# Patient Record
Sex: MALE | Race: BLACK OR AFRICAN AMERICAN | URBAN - METROPOLITAN AREA
[De-identification: names, ages, dates, MRNs, and addresses within clinical notes are randomized per-mention and may not be internally consistent; named-entity substitution may affect disease eponyms.]

---

## 2024-08-04 SDOH — HEALTH STABILITY: PHYSICAL HEALTH: ON AVERAGE, HOW MANY DAYS PER WEEK DO YOU ENGAGE IN MODERATE TO STRENUOUS EXERCISE (LIKE A BRISK WALK)?: 3 DAYS

## 2024-08-04 SDOH — HEALTH STABILITY: PHYSICAL HEALTH: ON AVERAGE, HOW MANY MINUTES DO YOU ENGAGE IN EXERCISE AT THIS LEVEL?: 30 MIN

## 2024-08-07 ENCOUNTER — OFFICE VISIT (OUTPATIENT)
Dept: PRIMARY CARE CLINIC | Age: 34
End: 2024-08-07
Payer: COMMERCIAL

## 2024-08-07 VITALS
DIASTOLIC BLOOD PRESSURE: 80 MMHG | WEIGHT: 176 LBS | SYSTOLIC BLOOD PRESSURE: 122 MMHG | HEART RATE: 89 BPM | BODY MASS INDEX: 28.28 KG/M2 | OXYGEN SATURATION: 97 % | HEIGHT: 66 IN

## 2024-08-07 DIAGNOSIS — Z87.19 H/O DENTAL ABSCESS: ICD-10-CM

## 2024-08-07 DIAGNOSIS — E10.3299 TYPE 1 DIABETES MELLITUS WITH BACKGROUND DIABETIC RETINOPATHY (HCC): ICD-10-CM

## 2024-08-07 DIAGNOSIS — Z76.89 ENCOUNTER TO ESTABLISH CARE: Primary | ICD-10-CM

## 2024-08-07 PROBLEM — K12.2 SUBMANDIBULAR ABSCESS: Status: ACTIVE | Noted: 2024-07-09

## 2024-08-07 PROBLEM — E11.10 DIABETIC ACIDOSIS WITHOUT COMA (HCC): Status: ACTIVE | Noted: 2024-07-07

## 2024-08-07 PROBLEM — M72.6 NECROTIZING FASCIITIS (HCC): Status: ACTIVE | Noted: 2024-07-09

## 2024-08-07 PROBLEM — E11.65 UNCONTROLLED DIABETES MELLITUS WITH HYPERGLYCEMIA (HCC): Status: ACTIVE | Noted: 2024-07-15

## 2024-08-07 PROCEDURE — 99203 OFFICE O/P NEW LOW 30 MIN: CPT | Performed by: NURSE PRACTITIONER

## 2024-08-07 RX ORDER — INSULIN LISPRO 100 [IU]/ML
7 INJECTION, SOLUTION INTRAVENOUS; SUBCUTANEOUS
COMMUNITY
Start: 2024-07-17 | End: 2025-07-17

## 2024-08-07 RX ORDER — PEN NEEDLE, DIABETIC 32GX 5/32"
NEEDLE, DISPOSABLE MISCELLANEOUS
Qty: 100 EACH | Status: CANCELLED | OUTPATIENT
Start: 2024-08-07

## 2024-08-07 RX ORDER — ISOPROPYL ALCOHOL 70 ML/100ML
SWAB TOPICAL
COMMUNITY
Start: 2024-07-17

## 2024-08-07 RX ORDER — INSULIN LISPRO 100 [IU]/ML
INJECTION, SOLUTION INTRAVENOUS; SUBCUTANEOUS
Qty: 3 ML | Refills: 5 | Status: SHIPPED | OUTPATIENT
Start: 2024-08-07

## 2024-08-07 RX ORDER — AMOXICILLIN AND CLAVULANATE POTASSIUM 875; 125 MG/1; MG/1
1 TABLET, FILM COATED ORAL 2 TIMES DAILY
COMMUNITY
Start: 2024-07-17 | End: 2024-08-20

## 2024-08-07 RX ORDER — BLOOD-GLUCOSE METER
EACH MISCELLANEOUS
COMMUNITY
Start: 2024-07-17

## 2024-08-07 RX ORDER — DOXYCYCLINE HYCLATE 100 MG/1
100 CAPSULE ORAL 2 TIMES DAILY
COMMUNITY
Start: 2024-07-17 | End: 2024-08-20

## 2024-08-07 RX ORDER — LANCETS 33 GAUGE
EACH MISCELLANEOUS
COMMUNITY
Start: 2024-07-17

## 2024-08-07 RX ORDER — PEN NEEDLE, DIABETIC 32GX 5/32"
NEEDLE, DISPOSABLE MISCELLANEOUS
COMMUNITY
Start: 2024-07-17

## 2024-08-07 RX ORDER — LANCETS 33 GAUGE
EACH MISCELLANEOUS
Status: CANCELLED | OUTPATIENT
Start: 2024-08-07

## 2024-08-07 RX ORDER — ACYCLOVIR 400 MG/1
1 TABLET ORAL SEE ADMIN INSTRUCTIONS
Qty: 1 EACH | Refills: 0 | Status: SHIPPED | OUTPATIENT
Start: 2024-08-07

## 2024-08-07 RX ORDER — INSULIN LISPRO 100 [IU]/ML
7 INJECTION, SOLUTION INTRAVENOUS; SUBCUTANEOUS
Status: CANCELLED | OUTPATIENT
Start: 2024-08-07 | End: 2025-08-07

## 2024-08-07 RX ORDER — ACYCLOVIR 400 MG/1
1 TABLET ORAL
Qty: 1 EACH | Refills: 11 | Status: SHIPPED | OUTPATIENT
Start: 2024-08-07

## 2024-08-07 SDOH — ECONOMIC STABILITY: FOOD INSECURITY: WITHIN THE PAST 12 MONTHS, YOU WORRIED THAT YOUR FOOD WOULD RUN OUT BEFORE YOU GOT MONEY TO BUY MORE.: NEVER TRUE

## 2024-08-07 SDOH — ECONOMIC STABILITY: INCOME INSECURITY: HOW HARD IS IT FOR YOU TO PAY FOR THE VERY BASICS LIKE FOOD, HOUSING, MEDICAL CARE, AND HEATING?: NOT HARD AT ALL

## 2024-08-07 SDOH — ECONOMIC STABILITY: FOOD INSECURITY: WITHIN THE PAST 12 MONTHS, THE FOOD YOU BOUGHT JUST DIDN'T LAST AND YOU DIDN'T HAVE MONEY TO GET MORE.: NEVER TRUE

## 2024-08-07 SDOH — ECONOMIC STABILITY: HOUSING INSECURITY
IN THE LAST 12 MONTHS, WAS THERE A TIME WHEN YOU DID NOT HAVE A STEADY PLACE TO SLEEP OR SLEPT IN A SHELTER (INCLUDING NOW)?: NO

## 2024-08-07 ASSESSMENT — ENCOUNTER SYMPTOMS
BACK PAIN: 0
COUGH: 0
SORE THROAT: 0
SINUS PRESSURE: 0
FACIAL SWELLING: 1
CHEST TIGHTNESS: 0
DIARRHEA: 0
SINUS PAIN: 0
COLOR CHANGE: 0
SHORTNESS OF BREATH: 0
PHOTOPHOBIA: 0
NAUSEA: 0
VOMITING: 0
ABDOMINAL PAIN: 0

## 2024-08-07 ASSESSMENT — PATIENT HEALTH QUESTIONNAIRE - PHQ9
SUM OF ALL RESPONSES TO PHQ QUESTIONS 1-9: 0
SUM OF ALL RESPONSES TO PHQ9 QUESTIONS 1 & 2: 0
SUM OF ALL RESPONSES TO PHQ QUESTIONS 1-9: 0
2. FEELING DOWN, DEPRESSED OR HOPELESS: NOT AT ALL
SUM OF ALL RESPONSES TO PHQ QUESTIONS 1-9: 0
1. LITTLE INTEREST OR PLEASURE IN DOING THINGS: NOT AT ALL
SUM OF ALL RESPONSES TO PHQ QUESTIONS 1-9: 0

## 2024-08-07 NOTE — PROGRESS NOTES
with [facility] . Was successfully extubated to RA on 2024. Continued on antibiotic treatment for infection. Underwent I&D again on . Cleared for discharge from [facility] point of view and will follow up with a doctor in [place]. For antibiotics, he will be discharged on Doxycycline & augmentin for 34 days (completing total of 6 weeks). Also, for diabetes management, he will be discharged on tresiba 24U ABT, Humalog 7U TID, 1:50 sliding scale insulin, repeat C-peptide in 4 eeks, consider continuous glucose monitoring, per endocrinology recommendations.\"    Overall, is feeling much better.  Has made considerable dietary and lifestyle modifications.  Poor vision that has been progressively worsening over the last several months.  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  Appears to be a cataract over the left eye.  Has vision appointment scheduled later this month.    Still mild facial swelling with the abscess, but continues to improve.  Continue with antibiotics as course outlined above.    Medication refills at patient's request-- however, he is following with a virtual endocrinologist. Declined refill to diabetes education. Low C-Peptide level: 0.40 on 24    Repeat A1C, lipids (trigs elevated) and Urine Microalbumin on next visit.    RTC 3 months.     Past Medical History:   Diagnosis Date    Diabetes mellitus (HCC)       History reviewed. No pertinent surgical history.  Family History   Problem Relation Age of Onset    Diabetes Father     Diabetes Maternal Grandmother      Social History     Tobacco Use    Smoking status: Former     Current packs/day: 0.00     Average packs/day: 1 pack/day for 10.0 years (10.0 ttl pk-yrs)     Types: Cigars, Cigarettes     Quit date: 10/1/2023     Years since quittin.8    Smokeless tobacco: Never   Substance Use Topics    Alcohol use: Never      Current Outpatient Medications   Medication Sig Dispense Refill    Lancets (ONETOUCH DELICA PLUS [id number]) MISC

## 2024-08-20 DIAGNOSIS — E10.3299 TYPE 1 DIABETES MELLITUS WITH BACKGROUND DIABETIC RETINOPATHY (HCC): ICD-10-CM

## 2024-08-20 RX ORDER — ACYCLOVIR 400 MG/1
1 TABLET ORAL
Qty: 1 EACH | Refills: 11 | OUTPATIENT
Start: 2024-08-20

## 2024-10-07 ENCOUNTER — OFFICE VISIT (OUTPATIENT)
Dept: PRIMARY CARE CLINIC | Age: 34
End: 2024-10-07
Payer: COMMERCIAL

## 2024-10-07 VITALS
HEIGHT: 66 IN | BODY MASS INDEX: 26.74 KG/M2 | DIASTOLIC BLOOD PRESSURE: 105 MMHG | OXYGEN SATURATION: 98 % | SYSTOLIC BLOOD PRESSURE: 148 MMHG | HEART RATE: 109 BPM | WEIGHT: 166.4 LBS

## 2024-10-07 DIAGNOSIS — I10 PRIMARY HYPERTENSION: ICD-10-CM

## 2024-10-07 DIAGNOSIS — N52.1 ERECTILE DYSFUNCTION DUE TO DISEASES CLASSIFIED ELSEWHERE: ICD-10-CM

## 2024-10-07 DIAGNOSIS — E08.42 DIABETIC POLYNEUROPATHY ASSOCIATED WITH DIABETES MELLITUS DUE TO UNDERLYING CONDITION (HCC): ICD-10-CM

## 2024-10-07 DIAGNOSIS — E10.3299 TYPE 1 DIABETES MELLITUS WITH BACKGROUND DIABETIC RETINOPATHY (HCC): Primary | ICD-10-CM

## 2024-10-07 PROCEDURE — 3044F HG A1C LEVEL LT 7.0%: CPT | Performed by: NURSE PRACTITIONER

## 2024-10-07 PROCEDURE — 3080F DIAST BP >= 90 MM HG: CPT | Performed by: NURSE PRACTITIONER

## 2024-10-07 PROCEDURE — 3077F SYST BP >= 140 MM HG: CPT | Performed by: NURSE PRACTITIONER

## 2024-10-07 PROCEDURE — 99214 OFFICE O/P EST MOD 30 MIN: CPT | Performed by: NURSE PRACTITIONER

## 2024-10-07 RX ORDER — LISINOPRIL 20 MG/1
20 TABLET ORAL DAILY
Qty: 30 TABLET | Refills: 2 | Status: SHIPPED | OUTPATIENT
Start: 2024-10-07 | End: 2025-01-05

## 2024-10-07 RX ORDER — GABAPENTIN 300 MG/1
300 CAPSULE ORAL 2 TIMES DAILY
Qty: 60 CAPSULE | Refills: 2 | Status: SHIPPED | OUTPATIENT
Start: 2024-10-07 | End: 2025-01-05

## 2024-10-07 RX ORDER — INSULIN LISPRO 100 [IU]/ML
INJECTION, SOLUTION INTRAVENOUS; SUBCUTANEOUS
Qty: 3 ML | Refills: 5 | Status: SHIPPED | OUTPATIENT
Start: 2024-10-07

## 2024-10-07 RX ORDER — SILDENAFIL 50 MG/1
50 TABLET, FILM COATED ORAL PRN
Qty: 30 TABLET | Refills: 0 | Status: SHIPPED | OUTPATIENT
Start: 2024-10-07 | End: 2024-11-06

## 2024-10-07 NOTE — ASSESSMENT & PLAN NOTE
Orders:    Lipid Panel; Future    Hemoglobin A1C; Future    lisinopril (PRINIVIL;ZESTRIL) 20 MG tablet; Take 1 tablet by mouth daily    gabapentin (NEURONTIN) 300 MG capsule; Take 1 capsule by mouth 2 times daily for 90 days. Intended supply: 30 days    Insulin Degludec 100 UNIT/ML SOPN; Inject 24 Units into the skin daily    insulin lispro, 1 Unit Dial, (HUMALOG KWIKPEN) 100 UNIT/ML SOPN; 7 units with meals three times daily plus sliding scale three time daily: BG less than 150: 0 units, -200: 3 units, B-250: 5 units, B-300: 8 units, B-350: 10 units, B-400: 12 units, BG>400 15 units and repeat in 1 hour, if still elevated, call PCP. Max dose: 51 units/day    Microalbumin, Ur    atorvastatin (LIPITOR) 20 MG tablet; Take 1 tablet by mouth daily

## 2024-10-07 NOTE — PROGRESS NOTES
Carolin Chavez (:  1990) is a 33 y.o. male,Established patient, here for evaluation of the following chief complaint(s):  Follow-up (2mo f/u/Neuropathy ) and Erectile Dysfunction         Assessment & Plan  Type 1 diabetes mellitus with background diabetic retinopathy (HCC)       Orders:    Lipid Panel; Future    Hemoglobin A1C; Future    lisinopril (PRINIVIL;ZESTRIL) 20 MG tablet; Take 1 tablet by mouth daily    gabapentin (NEURONTIN) 300 MG capsule; Take 1 capsule by mouth 2 times daily for 90 days. Intended supply: 30 days    Insulin Degludec 100 UNIT/ML SOPN; Inject 24 Units into the skin daily    insulin lispro, 1 Unit Dial, (HUMALOG KWIKPEN) 100 UNIT/ML SOPN; 7 units with meals three times daily plus sliding scale three time daily: BG less than 150: 0 units, -200: 3 units, B-250: 5 units, B-300: 8 units, B-350: 10 units, B-400: 12 units, BG>400 15 units and repeat in 1 hour, if still elevated, call PCP. Max dose: 51 units/day    Microalbumin, Ur    atorvastatin (LIPITOR) 20 MG tablet; Take 1 tablet by mouth daily    Diabetic polyneuropathy associated with diabetes mellitus due to underlying condition (HCC)   Chronic, worsening (exacerbation), continue current treatment plan         Primary hypertension   Chronic, not at goal (unstable), continue current treatment plan         Erectile dysfunction due to diseases classified elsewhere   New, not at goal (unstable), continue current treatment plan    Orders:    sildenafil (VIAGRA) 50 MG tablet; Take 1 tablet by mouth as needed for Erectile Dysfunction      Return in about 3 months (around 2025).       Subjective   HPI    Here today for routine follow up    Did not go to his endocrinology appointment or ID appointment after the facial absences.  Unclear reasons.    Did see opthalmology, and he has scheduled dates for cataract removal.  Sates his blood sugars are significantly improved and typically less than 120.  Still

## 2024-10-08 ENCOUNTER — HOSPITAL ENCOUNTER (OUTPATIENT)
Age: 34
Discharge: HOME | End: 2024-10-08
Payer: COMMERCIAL

## 2024-10-08 DIAGNOSIS — E10.3299 TYPE 1 DIABETES MELLITUS WITH BACKGROUND DIABETIC RETINOPATHY (HCC): ICD-10-CM

## 2024-10-08 LAB
CHOLEST SERPL-MCNC: 191 MG/DL (ref 0–199)
CHOLESTEROL/HDL RATIO: 4
CREAT UR-MCNC: 289 MG/DL (ref 39–259)
EST. AVERAGE GLUCOSE BLD GHB EST-MCNC: 134 MG/DL
HBA1C MFR BLD: 6.3 % (ref 4–6)
HDLC SERPL-MCNC: 43 MG/DL
LDLC SERPL CALC-MCNC: 124 MG/DL (ref 0–100)
MICROALBUMIN UR-MCNC: 820 MG/L (ref 0–20)
MICROALBUMIN/CREAT UR-RTO: 284 MCG/MG CREAT (ref 0–17)
TRIGL SERPL-MCNC: 119 MG/DL
VLDLC SERPL CALC-MCNC: 24 MG/DL

## 2024-10-08 PROCEDURE — 82570 ASSAY OF URINE CREATININE: CPT

## 2024-10-08 PROCEDURE — 83036 HEMOGLOBIN GLYCOSYLATED A1C: CPT

## 2024-10-08 PROCEDURE — 80061 LIPID PANEL: CPT

## 2024-10-08 PROCEDURE — 82043 UR ALBUMIN QUANTITATIVE: CPT

## 2024-10-08 PROCEDURE — 36415 COLL VENOUS BLD VENIPUNCTURE: CPT

## 2024-10-08 RX ORDER — ATORVASTATIN CALCIUM 20 MG/1
20 TABLET, FILM COATED ORAL DAILY
Qty: 90 TABLET | Refills: 0 | Status: SHIPPED | OUTPATIENT
Start: 2024-10-08

## 2024-10-08 ASSESSMENT — ENCOUNTER SYMPTOMS
VOMITING: 0
COLOR CHANGE: 0
ABDOMINAL PAIN: 0
SORE THROAT: 0
BACK PAIN: 0
SINUS PAIN: 0
SINUS PRESSURE: 0
SHORTNESS OF BREATH: 0
CHEST TIGHTNESS: 0
PHOTOPHOBIA: 0
NAUSEA: 0
COUGH: 0
DIARRHEA: 0

## 2025-01-08 ENCOUNTER — OFFICE VISIT (OUTPATIENT)
Dept: PRIMARY CARE CLINIC | Age: 35
End: 2025-01-08
Payer: COMMERCIAL

## 2025-01-08 VITALS
DIASTOLIC BLOOD PRESSURE: 87 MMHG | WEIGHT: 184.2 LBS | HEART RATE: 70 BPM | BODY MASS INDEX: 29.6 KG/M2 | OXYGEN SATURATION: 95 % | HEIGHT: 66 IN | SYSTOLIC BLOOD PRESSURE: 134 MMHG

## 2025-01-08 DIAGNOSIS — I10 PRIMARY HYPERTENSION: ICD-10-CM

## 2025-01-08 DIAGNOSIS — E10.3299 TYPE 1 DIABETES MELLITUS WITH BACKGROUND DIABETIC RETINOPATHY (HCC): Primary | ICD-10-CM

## 2025-01-08 DIAGNOSIS — G62.9 NEUROPATHY: ICD-10-CM

## 2025-01-08 PROCEDURE — 99214 OFFICE O/P EST MOD 30 MIN: CPT | Performed by: NURSE PRACTITIONER

## 2025-01-08 PROCEDURE — 3075F SYST BP GE 130 - 139MM HG: CPT | Performed by: NURSE PRACTITIONER

## 2025-01-08 PROCEDURE — 3079F DIAST BP 80-89 MM HG: CPT | Performed by: NURSE PRACTITIONER

## 2025-01-08 RX ORDER — LISINOPRIL 20 MG/1
20 TABLET ORAL DAILY
Qty: 90 TABLET | Refills: 1 | Status: SHIPPED | OUTPATIENT
Start: 2025-01-08 | End: 2025-07-07

## 2025-01-08 RX ORDER — PEN NEEDLE, DIABETIC 32GX 5/32"
1 NEEDLE, DISPOSABLE MISCELLANEOUS 3 TIMES DAILY
Qty: 100 EACH | Refills: 2 | Status: SHIPPED | OUTPATIENT
Start: 2025-01-08

## 2025-01-08 RX ORDER — LANCETS 33 GAUGE
1 EACH MISCELLANEOUS 3 TIMES DAILY
Qty: 100 EACH | Refills: 5 | Status: SHIPPED | OUTPATIENT
Start: 2025-01-08

## 2025-01-08 RX ORDER — ATORVASTATIN CALCIUM 20 MG/1
20 TABLET, FILM COATED ORAL DAILY
Qty: 90 TABLET | Refills: 1 | Status: SHIPPED | OUTPATIENT
Start: 2025-01-08 | End: 2025-01-10

## 2025-01-08 RX ORDER — INSULIN LISPRO 100 [IU]/ML
INJECTION, SOLUTION INTRAVENOUS; SUBCUTANEOUS
Qty: 3 ML | Refills: 5 | Status: SHIPPED | OUTPATIENT
Start: 2025-01-08

## 2025-01-08 RX ORDER — PREDNISOLONE ACETATE 10 MG/ML
SUSPENSION/ DROPS OPHTHALMIC
COMMUNITY
Start: 2024-12-17

## 2025-01-08 NOTE — PROGRESS NOTES
(ONETOUCH DELICA PLUS [id number]) MISC; Inject 1 each into the skin in the morning, at noon, and at bedtime, Disp-100 each, R-5Normal  2. Primary hypertension  3. Neuropathy       Assessment & Plan  1. Diabetes Mellitus.  Continue current regimen along with dietary and exercise modifications.  Refills on insulin and testing supplies given.  Maintain lisinopril and statin. Insulin antibodies on next visit as patient has yet to see endocrinology. Repeat A1C/labs on next visit.     2. Diabetic Neuropathy.  Resolved.  No need for med refills.  Continue with routine exercise/supportive care    3. Hypertension.  His blood pressure is well-controlled at 137/84 mmHg. He is advised to continue his current medication regimen and monitor his blood pressure regularly. He is currently taking lisinopril as prescribed.    4. Erectile Dysfunction.  He reports improvement in symptoms. No refill is required at this time.    6. Post-Cataract Surgery.  He reports that his vision has improved following cataract surgery. He is advised to continue using the eyedrops for his right eye as it remains sensitive to light.    Follow-up  The patient will follow up in 3 months.    PROCEDURE  The patient underwent cataract surgery.    Hemoglobin A1C   Date Value Ref Range Status   10/08/2024 6.3 (H) 4.0 - 6.0 % Final        Return in about 3 months (around 2025).  No orders of the defined types were placed in this encounter.    Orders Placed This Encounter   Medications    atorvastatin (LIPITOR) 20 MG tablet     Sig: Take 1 tablet by mouth daily     Dispense:  90 tablet     Refill:  1    Insulin Degludec 100 UNIT/ML SOPN     Sig: Inject 24 Units into the skin daily     Dispense:  3 mL     Refill:  5    insulin lispro, 1 Unit Dial, (HUMALOG KWIKPEN) 100 UNIT/ML SOPN     Si units with meals three times daily plus sliding scale three time daily: BG less than 150: 0 units, -200: 3 units, B-250: 5 units, B-300: 8 units, BG:

## 2025-01-10 DIAGNOSIS — E10.3299 TYPE 1 DIABETES MELLITUS WITH BACKGROUND DIABETIC RETINOPATHY (HCC): ICD-10-CM

## 2025-01-10 RX ORDER — ATORVASTATIN CALCIUM 20 MG/1
20 TABLET, FILM COATED ORAL DAILY
Qty: 90 TABLET | Refills: 1 | Status: SHIPPED | OUTPATIENT
Start: 2025-01-10